# Patient Record
Sex: MALE | Race: NATIVE HAWAIIAN OR OTHER PACIFIC ISLANDER | Employment: UNEMPLOYED | URBAN - METROPOLITAN AREA
[De-identification: names, ages, dates, MRNs, and addresses within clinical notes are randomized per-mention and may not be internally consistent; named-entity substitution may affect disease eponyms.]

---

## 2019-08-19 ENCOUNTER — HOSPITAL ENCOUNTER (EMERGENCY)
Facility: CLINIC | Age: 19
Discharge: HOME | End: 2019-08-20
Attending: EMERGENCY MEDICINE | Admitting: EMERGENCY MEDICINE

## 2019-08-19 DIAGNOSIS — F10.920 ALCOHOLIC INTOXICATION WITHOUT COMPLICATION (H): ICD-10-CM

## 2019-08-19 PROCEDURE — 25000128 H RX IP 250 OP 636

## 2019-08-19 PROCEDURE — 99285 EMERGENCY DEPT VISIT HI MDM: CPT

## 2019-08-19 RX ORDER — OLANZAPINE 10 MG/2ML
INJECTION, POWDER, FOR SOLUTION INTRAMUSCULAR
Status: COMPLETED
Start: 2019-08-19 | End: 2019-08-19

## 2019-08-19 RX ORDER — WATER 10 ML/10ML
INJECTION INTRAMUSCULAR; INTRAVENOUS; SUBCUTANEOUS
Status: DISCONTINUED
Start: 2019-08-19 | End: 2019-08-20 | Stop reason: HOSPADM

## 2019-08-19 RX ADMIN — OLANZAPINE: 10 INJECTION, POWDER, FOR SOLUTION INTRAMUSCULAR at 23:57

## 2019-08-19 ASSESSMENT — MIFFLIN-ST. JEOR: SCORE: 1905.3

## 2019-08-19 NOTE — ED AVS SNAPSHOT
Emergency Department  [address] [facility] [address]  Phone:  [phone]  Fax:  [phone]                                    Ramses Viera   MRN: [id number]    Department:   Emergency Department   Date of Visit:  8/19/2019           After Visit Summary Signature Page    I have received my discharge instructions, and my questions have been answered. I have discussed any challenges I see with this plan with the nurse or doctor.    ..........................................................................................................................................  Patient/Patient Representative Signature      ..........................................................................................................................................  Patient Representative Print Name and Relationship to Patient    ..................................................               ................................................  Date                                   Time    ..........................................................................................................................................  Reviewed by Signature/Title    ...................................................              ..............................................  Date                                               Time          22EPIC Rev 08/18

## 2019-08-20 VITALS
OXYGEN SATURATION: 99 % | TEMPERATURE: 97.4 F | HEIGHT: 66 IN | RESPIRATION RATE: 20 BRPM | DIASTOLIC BLOOD PRESSURE: 72 MMHG | BODY MASS INDEX: 33.75 KG/M2 | WEIGHT: 210 LBS | HEART RATE: 71 BPM | SYSTOLIC BLOOD PRESSURE: 108 MMHG

## 2019-08-20 LAB
ALBUMIN SERPL-MCNC: 4.3 G/DL (ref 3.4–5)
ALCOHOL BREATH TEST: 0 (ref 0–0.01)
ALP SERPL-CCNC: 106 U/L (ref 40–150)
ALT SERPL W P-5'-P-CCNC: 39 U/L (ref 0–70)
ANION GAP SERPL CALCULATED.3IONS-SCNC: 9 MMOL/L (ref 3–14)
APAP SERPL-MCNC: <2 MG/L (ref 10–20)
AST SERPL W P-5'-P-CCNC: 41 U/L (ref 0–45)
BILIRUB SERPL-MCNC: 0.3 MG/DL (ref 0.2–1.3)
BUN SERPL-MCNC: 13 MG/DL (ref 7–30)
CALCIUM SERPL-MCNC: 8.2 MG/DL (ref 8.5–10.1)
CHLORIDE SERPL-SCNC: 111 MMOL/L (ref 94–109)
CO2 SERPL-SCNC: 22 MMOL/L (ref 20–32)
CREAT SERPL-MCNC: 0.78 MG/DL (ref 0.66–1.25)
ETHANOL SERPL-MCNC: 0.25 G/DL
GFR SERPL CREATININE-BSD FRML MDRD: >90 ML/MIN/{1.73_M2}
GLUCOSE SERPL-MCNC: 122 MG/DL (ref 70–99)
POTASSIUM SERPL-SCNC: 3.8 MMOL/L (ref 3.4–5.3)
PROT SERPL-MCNC: 8.1 G/DL (ref 6.8–8.8)
SODIUM SERPL-SCNC: 142 MMOL/L (ref 133–144)
TSH SERPL DL<=0.005 MIU/L-ACNC: 0.41 MU/L (ref 0.4–4)

## 2019-08-20 PROCEDURE — 80053 COMPREHEN METABOLIC PANEL: CPT | Performed by: EMERGENCY MEDICINE

## 2019-08-20 PROCEDURE — 80320 DRUG SCREEN QUANTALCOHOLS: CPT | Performed by: EMERGENCY MEDICINE

## 2019-08-20 PROCEDURE — 80329 ANALGESICS NON-OPIOID 1 OR 2: CPT | Performed by: EMERGENCY MEDICINE

## 2019-08-20 PROCEDURE — 84443 ASSAY THYROID STIM HORMONE: CPT | Performed by: EMERGENCY MEDICINE

## 2019-08-20 NOTE — ED NOTES
Patient's friends in ED waiting area. This writer spoke with them. They report patient had been drinking alcohol tonight, denies any drug use and patient disappeared. Friends then went out to dinner and after asked the mall security where patient was which lead them here. Friends report they are in town from [place]. They are staying at the [facility] in [place]. They would like to be contacted when patient is discharged. See information below:    Girish Ennis Speaker: [phone]    Le Rand: [phone]

## 2019-08-20 NOTE — ED NOTES
"Patient screaming and pacing in room. Pt threatening staff stating \" I am going to kill you and eat your insides\" \" iraj kill me\" patient placed in 5 point restraints and medicated with 10mg IM zyprexa  "

## 2019-08-20 NOTE — ED TRIAGE NOTES
Patient found in parking structure at [facility] [employer] waving at people and acting strange. Patient from out of town. Pt admits to drinking alcohol, denies anything else. Patient arrived in restraints. Pt removed out of restraints on arrival to ED. Patient not tolerating being out of restraints. Pt states he is going to harm staff and states that we cannot trust him.

## 2019-08-20 NOTE — ED NOTES
Bed: [facility]  Expected date: 8/19/19  Expected time: 11:34 PM  Means of arrival:   Comments:  531  19M  ETOH/Restrained  [id number]

## 2019-08-20 NOTE — ED NOTES
RN at bedside. Patient asleep. Oxygen saturation 88% on room air.  Head of bed elevated and 3L oxygen placed on patient. Pt remains on monitor. Security officers paged to remove patient out of restraints.

## 2019-08-20 NOTE — ED NOTES
5 point restraints removed off patient. Patient remains on monitor. Pt asleep, respirations even and unlabored

## 2019-08-20 NOTE — DISCHARGE INSTRUCTIONS

## 2019-08-20 NOTE — ED PROVIDER NOTES
"  History     Chief Complaint:  Altered mental status    The history is provided by the patient. The history is limited by the condition of the patient.      Ramses Viera is a 19 year old male who arrived via EMS and presents with altered mental status. According to police, the patient reports drinking alcohol, but denies any drug use. Patient arrives in restraints but these are removed in the ED. The patient states, \"I'm scared that I'm going to hurt myself.\" Patient is upset, agitated, and trying to leave the emergency department. The patient reports having severe anxiety. In addition, the patient denies any pain.     Allergies:  Ibuprofen      Medications:    The patient is not currently taking any prescribed medications.    Past Medical History:    The patient does not have any past pertinent medical history.    Past Surgical History:    History reviewed. No pertinent surgical history.    Family History:    History reviewed. No pertinent family history.     Social History:  Smoking status: Unknown  Alcohol use: Yes  Drug use: Unknown  The patient presents to the emergency department by himself.  Marital Status:  Single     Review of Systems   Unable to perform ROS: Mental status change       Physical Exam     Patient Vitals for the past 24 hrs:   BP Temp Temp src Pulse Heart Rate Resp SpO2 Height Weight   08/20/19 0219 -- -- -- -- 90 15 99 % -- --   08/20/19 0206 -- -- -- -- 91 16 99 % -- --   08/20/19 0200 114/68 -- -- 89 91 16 99 % -- --   08/20/19 0143 -- -- -- -- 87 14 99 % -- --   08/20/19 0100 93/60 -- -- 95 93 16 98 % -- --   08/20/19 0034 111/58 -- -- 82 88 13 99 % -- --   08/20/19 0013 -- -- -- -- 92 15 96 % -- --   08/20/19 0002 (!) 157/104 -- -- 125 93 11 -- -- --   08/19/19 2346 129/82 97.4  F (36.3  C) Oral 88 88 18 97 % 1.676 m (5' 6\") 95.3 kg (210 lb)     Physical Exam  Constitutional: mildly slurred speech, unsteady gait, smells of alcohol.   HENT:    Nose: Nose normal.    " Mouth/Throat: Oropharynx is clear, mucous membranes are dry  Eyes: EOM are normal, anicteric, conjugate gaze horizontal nystagmus  CV: regular rate and rhythm; no murmurs  Chest: Effort normal and breath sounds clear without wheezing or rales, symmetric bilaterally   GI:  non tender. No distension. No guarding or rebound.    MSK: No LE edema, no tenderness to palpation of BLE.  Neurological: Mildly slurred speech, horizontal nystagmus, moving all extremities  Skin: Skin is warm and dry.    Emergency Department Course   Laboratory:  Laboratory findings were communicated with the patient who voiced understanding of the findings.    CMP: Chloride 111 H, Glucose 122 H, Calcium 8.2 L o/w WNL (Creatinine 0.78)  Alcohol Level blood: 0.25 H  Acetaminophen Level: <2  Alcohol breath test POCT: 0.00  TSH with free T4 reflex: 0.41    Interventions:     Medications   sterile water (preservative free) injection (has no administration in time range)   OLANZapine (zyPREXA) 10 MG injection (  Given 19 0286)      Impression & Plan   Medical Decision Makin-year-old male with no reported past medical history presenting for evaluation of altered mental status after becoming agitated while at the [employer] waving and acting strange prompting evaluation by [employer] security who ultimately called 911.  There are was a suspicion for alcohol use based on his mild cerebellar impairment and patient smelling of alcohol.  He was unable to provide a breathalyzer sample as he became agitated requiring brief restraints due to risk of harming self and others due to his intoxication.  Blood ethanol level returned at 0.25, he was given IM Zyprexa for his agitation and was resting comfortably.  After period of observation, he cleared as expected and denied any complaints including suicidal or homicidal ideation.  He was felt safe for discharge home and was discharged to the care of his friends.  At time of discharge, he was ambulatory with  a steady gait, able to take p.o.    Discharge Diagnosis:    ICD-10-CM    1. Alcoholic intoxication without complication (H) F10.920        Disposition:  Signed out to my partner.    Discharge Medications:  None    Rakan Feliciano MD   [employer]  6:56 AM 08/20/19       Scribe Disclosure:  I, Jenny Vergara, am serving as a scribe at 2:04 AM on 8/20/2019 to document services personally performed by Rakan Feliciano MD based on my observations and the provider's statements to me.     8/20/2019  Rakan Feliciano MD Mikayla Bartelt  8/19/2019    EMERGENCY DEPARTMENT  Scribe Disclosure:  I, Osmel Zhao, am serving as a scribe at 3:16 AM on 8/20/2019 to document services personally performed by Rakan Feliciano MD based on my observations and the provider's statements to me.      Rakan Feliciano MD  08/20/19 0657       Rakan Feliciano MD  08/20/19 0721